# Patient Record
Sex: MALE | URBAN - METROPOLITAN AREA
[De-identification: names, ages, dates, MRNs, and addresses within clinical notes are randomized per-mention and may not be internally consistent; named-entity substitution may affect disease eponyms.]

---

## 2020-03-03 ENCOUNTER — APPOINTMENT (RX ONLY)
Dept: URBAN - METROPOLITAN AREA CLINIC 34 | Facility: CLINIC | Age: 55
Setting detail: DERMATOLOGY
End: 2020-03-03

## 2020-03-03 DIAGNOSIS — R21 RASH AND OTHER NONSPECIFIC SKIN ERUPTION: ICD-10-CM

## 2020-03-03 DIAGNOSIS — L81.0 POSTINFLAMMATORY HYPERPIGMENTATION: ICD-10-CM

## 2020-03-03 DIAGNOSIS — L73.1 PSEUDOFOLLICULITIS BARBAE: ICD-10-CM

## 2020-03-03 PROCEDURE — ? ADDITIONAL NOTES

## 2020-03-03 PROCEDURE — ? PRESCRIPTION

## 2020-03-03 PROCEDURE — ? COUNSELING

## 2020-03-03 PROCEDURE — 99202 OFFICE O/P NEW SF 15 MIN: CPT

## 2020-03-03 RX ORDER — FLUOCINONIDE 0.5 MG/G
OINTMENT TOPICAL
Qty: 1 | Refills: 0 | Status: ERX | COMMUNITY
Start: 2020-03-03

## 2020-03-03 RX ADMIN — FLUOCINONIDE: 0.5 OINTMENT TOPICAL at 00:00

## 2020-03-03 ASSESSMENT — LOCATION DETAILED DESCRIPTION DERM: LOCATION DETAILED: LEFT INFERIOR LATERAL NECK

## 2020-03-03 ASSESSMENT — LOCATION SIMPLE DESCRIPTION DERM: LOCATION SIMPLE: LEFT ANTERIOR NECK

## 2020-03-03 ASSESSMENT — LOCATION ZONE DERM: LOCATION ZONE: NECK

## 2020-06-04 ENCOUNTER — APPOINTMENT (RX ONLY)
Dept: URBAN - METROPOLITAN AREA CLINIC 34 | Facility: CLINIC | Age: 55
Setting detail: DERMATOLOGY
End: 2020-06-04

## 2020-06-04 DIAGNOSIS — L81.0 POSTINFLAMMATORY HYPERPIGMENTATION: ICD-10-CM

## 2020-06-04 DIAGNOSIS — L72.8 OTHER FOLLICULAR CYSTS OF THE SKIN AND SUBCUTANEOUS TISSUE: ICD-10-CM

## 2020-06-04 PROCEDURE — ? TREATMENT REGIMEN

## 2020-06-04 PROCEDURE — ? ADDITIONAL NOTES

## 2020-06-04 PROCEDURE — 99213 OFFICE O/P EST LOW 20 MIN: CPT

## 2020-06-04 PROCEDURE — ? COUNSELING

## 2020-06-04 ASSESSMENT — LOCATION ZONE DERM: LOCATION ZONE: NECK

## 2020-06-04 ASSESSMENT — LOCATION DETAILED DESCRIPTION DERM: LOCATION DETAILED: LEFT SUPERIOR LATERAL NECK

## 2020-06-04 ASSESSMENT — LOCATION SIMPLE DESCRIPTION DERM: LOCATION SIMPLE: LEFT ANTERIOR NECK

## 2020-06-04 NOTE — PROCEDURE: TREATMENT REGIMEN
Initiate Treatment: Hydroquinone 8% compound sent to [facility] [place]. Apply at night Initiate Treatment: Hydroquinone 8% compound sent to [facility] [place]. Apply at night

## 2021-12-22 NOTE — PROCEDURE: ADDITIONAL NOTES
PROCEDURE: Colonoscopy    ENDOSCOPIST: Joe Ghotra M.D. PREOPERATIVE DIAGNOSIS: CRCS (also with diarrhea)    POSTOPERATIVE DIAGNOSIS:     SEDATION: MAC    INSTRUMENT: CF H190    DESCRIPTION OF PROCEDURE:  After informed consent was obtained the patient was placed in the left lateral decubitus position. Intravenous sedation was administered as above. After adequate sedation had been achieved, digital rectal examination was performed. The colonoscope was then inserted through the anus and followed the course of the rectum and colon to the cecum, which was confirmed by visualization of the ileocecal valve and appendiceal orifice. The colonoscope was withdrawn from that point, performing a careful survey of the mucosa. Retroflexion was performed in the rectum. FINDINGS:  Normal colonic mucosa from rectum to cecum was normal without inflammation. Brief look into the TI was normal. No polyps or masses seen. Random colon bx's taken. Internal hemorrhoids seen on retroflexion which caused mild oozing- stopped on its own.     Estimated Blood Loss:  0 cc-min    IMPRESSION:  Internal hemorrhoids    PLAN:  - F/u path  - Continue probiotic  - [place] in 10yrs  - OV as planned    YOUSIF Durand MD
PROCEDURE: Esophagogastroduodenoscopy    ENDOSCOPIST: Renee Parada M.D. PREOPERATIVE DIAGNOSIS: Nausea     POSTOPERATIVE DIAGNOSIS: Normal    INSTRUMENTS: GIF H190    SEDATION: MAC    DESCRIPTION: After informed consent was obtained, the patient was taken to the endoscopy suite and placed in the left lateral decubitus position. Intravenous sedation was administered by the Anesthesia provider. After adequate sedation had been achieved the endoscope was inserted over the tongue and through the posterior pharynx under direct visualization down the esophagus to the stomach and into the second portion of the duodenum. The endoscopic was withdrawn from that point, performing a careful survey of the mucosa. Retroflexion was performed in the gastric fundus. FINDINGS:  Esophagus: Normal appearing mucosa without esophagitis or large hiatal hernia. Stomach: Normal gastric mucosa with small amount of retained food. Bx's. Duodenum: Normal duodenal mucosa.      Estimated blood loss:  0 cc-minimal    IMPRESSION:   Normal EGD    PLAN:  - F/u path  - OV as planned  - May consider trial of Reglan if nausea persists     YOUSIF Barron MD
Additional Notes: Discussed to discontinue using soap by axe. And to switch to dove soap. \\n\\nRight now do not see evidence of inflammation, which causing itching. Will change to a higher strength topical steroid.
Principal Discharge DX:	HNP (herniated nucleus pulposus), lumbar  Goal:	Recovery  Instructions for follow-up, activity and diet:	See below
Detail Level: Simple
Additional Notes: Discussed can treat if patient wishes, after inflammation calmed down.
Additional Notes: Will treat when itching subsides